# Patient Record
Sex: MALE | Race: WHITE
[De-identification: names, ages, dates, MRNs, and addresses within clinical notes are randomized per-mention and may not be internally consistent; named-entity substitution may affect disease eponyms.]

---

## 2020-06-04 ENCOUNTER — HOSPITAL ENCOUNTER (EMERGENCY)
Dept: HOSPITAL 56 - MW.ED | Age: 45
Discharge: HOME | End: 2020-06-04
Payer: MEDICARE

## 2020-06-04 DIAGNOSIS — R07.89: Primary | ICD-10-CM

## 2020-06-04 DIAGNOSIS — F17.210: ICD-10-CM

## 2020-06-04 DIAGNOSIS — G89.18: ICD-10-CM

## 2020-06-04 DIAGNOSIS — Z88.7: ICD-10-CM

## 2020-06-04 DIAGNOSIS — Z20.828: ICD-10-CM

## 2020-06-04 NOTE — CR
Chest: 2 views of the chest were obtained.

 

Comparison: Prior chest x-ray of 01/25/16.

 

Heart size and mediastinum are normal.  Lungs are clear with no acute 

parenchymal change.  Bony structures are unremarkable.

 

Impression:

1.  Nothing acute is seen on 2 view chest x-ray.

 

Diagnostic code #1

 

This report was dictated in MDT

## 2020-06-04 NOTE — EDM.PDOC
ED [facility] GENERAL MEDICAL PROBLEM





- General


Chief Complaint: Chest Pain


Stated Complaint: CHEST PAIN


Time Seen by Provider: 06/04/20 11:52





- History of Present Illness


INITIAL COMMENTS - FREE TEXT/NARRATIVE: 





HISTORY AND PHYSICAL:





History of present illness:


This 44-year-old male presents with chest pain.  His primary concern is he 

wants to know if he has risk factors for COVID-19 and he has intermittent chest 

pain.  His chest pain has been hurting since February.  When I asked him about 

this he tells a story of a near death experience where he saw Jose L العراقي and 

got together.  They told him how to clear his airway and he relates a story of 

clearing the vomit out of his mouth with his own finger and coughing to save 

his own life.  He says he sometimes speaks to God and Jose L at other x2.  

Thankfully he made it through this episode of vomiting and apnea and did not 

die.  He said it was very traumatic for him.  Today he presents asking for 

evaluation given he has some residual chest pain but no cough, fever or other 

symptoms.  He says pain is on and off and comes for seconds at a time.  It does 

not radiate.  There is no diaphoresis.  There is no vomiting.





Review of systems: 


A 10-point review of systems, other than pertinent positives and negatives as 

stated per HPI, is otherwise negative.





Past medical history: 


As per history of present illness and as reviewed below otherwise 

noncontributory.





Surgical history: 


As per history of present illness and as reviewed below otherwise 

noncontributory.





Social history: 


No reported history of drug or alcohol abuse.





Family history: 


As per history of present illness and as reviewed below otherwise 

noncontributory.





Physical exam:


VITAL SIGNS:  Reviewed.


GENERAL: In no apparent distress.


HEAD: No signs of head trauma.


EYES: Pupils are equal.  Extraocular motions intact.


EARS: Hearing grossly intact.


MOUTH: Oropharynx is normal.


NECK: No adenopathy, no JVD.   


CHEST: Chest with clear breath sounds bilaterally.  No wheezes, rales, or 

rhonchi.


CARDIAC: Regular rate and rhythm.  Normal S1 and S2, without murmurs, gallops, 

or rubs.


VASCULAR: Peripheral pulses normal and equal in all extremities.


ABDOMEN: Soft, without detectable tenderness.  No sign of distention.  No   

rebound or guarding, and no masses palpated.


MUSCULOSKELETAL: Good range of motion of all major joints. Extremities without 

clubbing, cyanosis or edema.


NEUROLOGIC EXAM: Alert and oriented x 3.  No focal sensory or motor deficits.  

Speech normal.  Follows commands.


PSYCHIATRIC: Mood normal.


SKIN: No rash or lesions.








Initial Differential Diagnosis & Plan:


Evaluation for possible aspiration residual symptoms I will evaluate with a 

chest x-ray.  Patient does not have high risk features for COVID-19.  Given his 

presentation I do not feel he needs COVID-19 testing.  He is not a fever.  He 

has normal oxygen saturation.  Otherwise normal vital signs.








Definitive disposition and diagnosis as appropriate pending reevaluation and 

review of above.








  ** right chest


Pain Score (Numeric/FACES): 2





- Related Data


 Allergies











Allergy/AdvReac Type Severity Reaction Status Date / Time


 


tetanus and diphtheria Allergy  Redness Verified 06/04/20 11:53





toxoids     











Home Meds: 


 Home Meds





. [No Known Home Meds]  06/04/20 [History]











Past Medical History





- Past Surgical History


Musculoskeletal Surgical History: Reports: Other (See Below)


Other Musculoskeletal Surgeries/Procedures:: Toe





Social & Family History





- Family History


Oncologic: Reports: Breast, Esophageal, Lung





- Tobacco Use


Smoking Status *Q: Current Every Day Smoker


Years of Tobacco use: 22


Packs/Tins Daily: 25





- Recreational Drug Use


Recreational Drug Use: Yes


Drug Use in Last 12 Months: Yes


Recreational Drug Type: Reports: Marijuana/Hashish


Recreational Drug Use Frequency: Daily





ED ROS GENERAL





- Review of Systems


Review Of Systems: Unable To Obtain (noted)


Reason Not Obtained: noted 





ED EXAM, GENERAL





- Physical Exam


Exam: See Below (noted)





EKG INTERPRETATION


EKG Interpretation Comments: 








12 lead EKG interpretation 


Obtained: June 4 20 11:51 AM


Rhythm: Sinus


Rate: 89


Axis: Normal


Intervals: Normal


ST/T Segments: No acute ischemic changes


Interpretation: Sinus Rhythm








Course





- Vital Signs


Last Recorded V/S: 


 Last Vital Signs











Temp  96.3 F L  06/04/20 11:50


 


Pulse  60   06/04/20 11:50


 


Resp  16   06/04/20 11:50


 


BP  118/78   06/04/20 11:50


 


Pulse Ox  98   06/04/20 11:50














- Orders/Labs/Meds


Orders: 


 Active Orders 24 hr











 Category Date Time Status


 


 EKG 12 Lead [EKG Documentation Completion] [RC] ROUTINE Care  06/04/20 12:07 

Active


 


 Chest 2V [CR] Stat Exams  06/04/20 12:17 Taken














- Re-Assessments/Exams


Free Text/Narrative Re-Assessment/Exam: 





06/04/20 12:41


Chest x-ray appears normal.  No evidence of pneumonia.  I feel the patient can 

be discharged at this point.  No evidence of COVID-19.  I will have him follow-

up with his primary care doctor for further evaluation.





Patient has no pain with swallowing.  This makes esophageal rupture highly 

unlikely.  Chest x-ray is normal.  There is no evidence of apical capping, 

widened mediastinum, left lower lobe effusion, separation of calcium ring at 

the aortic notch, deviation mainstem bronchus or other signs of aortic 

dissection.  Equal radial pulses.  No pulsatile mass.  No exertional symptoms.





Diagnostic impression:


1.  Post aspiration chest pain








Departure





- Departure


Time of Disposition: 12:42


Disposition: Home, Self-Care 01


Clinical Impression: 


 Atypical chest pain





Instructions:  Nonspecific Chest Pain, Adult


Referrals: 


PCP,Nae [Primary Care Provider] - 


[facility] [Outside]


Forms:  ED Department Discharge


Additional Instructions: 


The following information is given to patients seen in the emergency department 

who are being discharged to home. This information is to outline your options 

for follow-up care. We provide all patients seen in our emergency department 

with a follow-up referral.





The need for follow-up, as well as the timing and circumstances, are variable 

depending upon the specifics of your emergency department visit.





If you don't have a primary care physician on staff, we will provide you with a 

referral. We always advise you to contact your personal physician following an 

emergency department visit to inform them of the circumstance of the visit and 

for follow-up with them and/or the need for any referrals to a consulting 

specialist.





The emergency department will also refer you to a specialist when appropriate. 

This referral assures that you have the opportunity for follow-up care with a 

specialist. All of these measure are taken in an effort to provide you with 

optimal care, which includes your follow-up.





Under all circumstances we always encourage you to contact your private 

physician who remains a resource for coordinating your care. When calling for 

follow-up care, please make the office aware that this follow-up is from your 

recent emergency room visit. If for any reason you are refused follow-up, 

please contact the [facility] Emergency 

Department at [phone] and asked to speak to the emergency department 

charge nurse.





Thank you for coming to the [facility] urgency department for 

your care today.  It was my pleasure to take care of you.





Your chest x-ray and EKG are normal.  You do not have signs or symptoms of COVID

-19.  Please follow-up in our clinic as listed below.  Return to emergency 

department for persistent pain, pain with exertion or any other concerns.





[facility] - Internal Medicine


[address] [place] [id number]


Phone: [phone]


Fax: [phone]











Sepsis Event Note





- Evaluation


Sepsis Screening Result: No Definite Risk





- Focused Exam


Vital Signs: 


 Vital Signs











  Temp Pulse Resp BP Pulse Ox


 


 06/04/20 11:50  96.3 F L  60  16  118/78  98











Date Exam was Performed: 06/04/20


Time Exam was Performed: 12:41





- My Orders


Last 24 Hours: 


My Active Orders





06/04/20 12:07


EKG 12 Lead [EKG Documentation Completion] [RC] ROUTINE 





06/04/20 12:17


Chest 2V [CR] Stat 














- Assessment/Plan


Last 24 Hours: 


My Active Orders





06/04/20 12:07


EKG 12 Lead [EKG Documentation Completion] [RC] ROUTINE 





06/04/20 12:17


Chest 2V [CR] Stat